# Patient Record
(demographics unavailable — no encounter records)

---

## 2024-11-23 NOTE — HISTORY OF PRESENT ILLNESS
[FreeTextEntry1] : pt presents for 3M FU, b/w [de-identified] : He doesn't want to see a breast specialist at this time.  He "popped" a pimple inside the right nostril and the area is a little painful and swollen.  His weight is stable.  He hasn't scheduled the eye exam or colonoscopy yet.

## 2024-11-23 NOTE — PLAN
[FreeTextEntry1] : For the nasal cellulitis he was advised to apply mupirocin ointment 3 times a day and to take doxycycline 100 mg twice daily for 10 days and she will call in 2 to 3 days if not improved.  He was strongly advised to schedule the screening colonoscopy and the eye exam.  The lab results from our prior visit were reviewed again.  For the stable diabetes and obesity, he will continue using Ozempic 1 mg weekly.  He will also continue taking Jardiance 25 mg daily.  For stable hyperlipidemia he will continue taking rosuvastatin 5 mg daily and for the stable asthma he will continue taking montelukast 10 mg daily and using the Advair 100/50 twice daily.  Labs were ordered and will be drawn in the office today to reassess his health conditions, and he will follow-up in 3 months or sooner if needed.

## 2024-11-23 NOTE — REVIEW OF SYSTEMS
[Negative] : Heme/Lymph [Recent Change In Weight] : ~T no recent weight change [FreeTextEntry4] : redness, pain and swelling in right nostril

## 2024-11-23 NOTE — PHYSICAL EXAM
[No Acute Distress] : no acute distress [Well Nourished] : well nourished [Well Developed] : well developed [Well-Appearing] : well-appearing [Normal Voice/Communication] : normal voice/communication [No Respiratory Distress] : no respiratory distress  [Normal Mood] : the mood was normal [de-identified] : mild redness and swelling in right nostril medially

## 2025-03-08 NOTE — PLAN
[FreeTextEntry1] : He was advised to schedule an ophthalmology consultation and to schedule the screening colonoscopy.  Due to the weight gain and decreased appetite control I increased the Ozempic to 2 mg weekly.  The lab results from our prior visit were reviewed again.  He will also continue taking Jardiance 25 mg for the stable diabetes.  For the stable hyperlipidemia he will continue taking rosuvastatin 5 mg daily and for the stable asthma he will continue taking montelukast 10 mg daily and using Advair 100/50 twice a day.  He was advised to follow-up in 3 months or sooner if needed.

## 2025-03-08 NOTE — HISTORY OF PRESENT ILLNESS
[FreeTextEntry1] : pt presents for med follow up  [de-identified] : He reports that he has gained a little weight and is struggling a little bit with his appetite control.  He is upset that his ophthalmologist has retired and hasn't scheduled an eye exam yet.  He will go for the colonoscopy.  The asthma is stable.

## 2025-07-01 NOTE — PLAN
[FreeTextEntry1] : He was again advised to schedule the diabetic eye exam and the colonoscopy. For the stable diabetes, he will continue on Ozempic 2 mg weekly and Jardiance 25 mg daily. For the stable hyperlipidemia he will continue taking rosuvastatin 5 mg daily and for the stable asthma he will continue on montelukast 10 mg daily and Advair 100/50 bid.  lab testing was ordered and will be drawn in the office today to assess for anemia and his WBC count, evaluate the diabetes and hepatic and renal function, prostate health and lipid status

## 2025-07-01 NOTE — HISTORY OF PRESENT ILLNESS
[FreeTextEntry1] :  Patient presents for 3 month follow up  [de-identified] : He is tolerating the 2 mg dose of Ozempic.  He will schedule the eye exam and the colonoscopy.